# Patient Record
Sex: MALE | Race: BLACK OR AFRICAN AMERICAN
[De-identification: names, ages, dates, MRNs, and addresses within clinical notes are randomized per-mention and may not be internally consistent; named-entity substitution may affect disease eponyms.]

---

## 2020-07-17 ENCOUNTER — HOSPITAL ENCOUNTER (INPATIENT)
Dept: HOSPITAL 26 - MED | Age: 30
LOS: 4 days | Discharge: HOME | DRG: 637 | End: 2020-07-21
Attending: FAMILY MEDICINE | Admitting: FAMILY MEDICINE
Payer: COMMERCIAL

## 2020-07-17 VITALS — BODY MASS INDEX: 39.82 KG/M2 | HEIGHT: 72 IN | WEIGHT: 294 LBS

## 2020-07-17 DIAGNOSIS — Z20.828: ICD-10-CM

## 2020-07-17 DIAGNOSIS — I34.0: ICD-10-CM

## 2020-07-17 DIAGNOSIS — E11.9: ICD-10-CM

## 2020-07-17 DIAGNOSIS — Z79.4: ICD-10-CM

## 2020-07-17 DIAGNOSIS — E87.6: ICD-10-CM

## 2020-07-17 DIAGNOSIS — E78.2: ICD-10-CM

## 2020-07-17 DIAGNOSIS — E66.9: ICD-10-CM

## 2020-07-17 DIAGNOSIS — K30: ICD-10-CM

## 2020-07-17 DIAGNOSIS — E78.5: ICD-10-CM

## 2020-07-17 DIAGNOSIS — E83.42: ICD-10-CM

## 2020-07-17 DIAGNOSIS — N17.0: ICD-10-CM

## 2020-07-17 DIAGNOSIS — E87.1: ICD-10-CM

## 2020-07-17 DIAGNOSIS — E11.10: Primary | ICD-10-CM

## 2020-07-17 DIAGNOSIS — F20.9: ICD-10-CM

## 2020-07-17 DIAGNOSIS — E83.39: ICD-10-CM

## 2020-07-17 LAB
ALBUMIN FLD-MCNC: 4.4 G/DL (ref 3.4–5)
AMYLASE SERPL-CCNC: 23 U/L (ref 25–115)
ANION GAP SERPL CALCULATED.3IONS-SCNC: 22.2 MMOL/L (ref 8–16)
ANION GAP SERPL CALCULATED.3IONS-SCNC: 26.4 MMOL/L (ref 8–16)
ANION GAP SERPL CALCULATED.3IONS-SCNC: 32.6 MMOL/L (ref 8–16)
ANION GAP SERPL CALCULATED.3IONS-SCNC: 35.4 MMOL/L (ref 8–16)
APPEARANCE UR: CLEAR
AST SERPL-CCNC: 28 U/L (ref 15–37)
BASOPHILS # BLD AUTO: 0.1 K/UL (ref 0–0.22)
BASOPHILS NFR BLD AUTO: 0.7 % (ref 0–2)
BILIRUB SERPL-MCNC: 0.6 MG/DL (ref 0–1)
BILIRUB UR QL STRIP: NEGATIVE
BUN SERPL-MCNC: 10 MG/DL (ref 7–18)
BUN SERPL-MCNC: 11 MG/DL (ref 7–18)
BUN SERPL-MCNC: 15 MG/DL (ref 7–18)
BUN SERPL-MCNC: 17 MG/DL (ref 7–18)
CHLORIDE SERPL-SCNC: 109 MMOL/L (ref 98–107)
CHLORIDE SERPL-SCNC: 111 MMOL/L (ref 98–107)
CHLORIDE SERPL-SCNC: 93 MMOL/L (ref 98–107)
CHLORIDE SERPL-SCNC: 99 MMOL/L (ref 98–107)
CHOLEST/HDLC SERPL: 9.5 {RATIO} (ref 1–4.5)
CO2 SERPL-SCNC: 10 MMOL/L (ref 21–32)
CO2 SERPL-SCNC: 10.6 MMOL/L (ref 21–32)
CO2 SERPL-SCNC: 12.1 MMOL/L (ref 21–32)
CO2 SERPL-SCNC: 18.6 MMOL/L (ref 21–32)
COLOR UR: YELLOW
CREAT SERPL-MCNC: 1.4 MG/DL (ref 0.6–1.3)
CREAT SERPL-MCNC: 1.5 MG/DL (ref 0.6–1.3)
CREAT SERPL-MCNC: 1.6 MG/DL (ref 0.6–1.3)
CREAT SERPL-MCNC: 1.8 MG/DL (ref 0.6–1.3)
EOSINOPHIL # BLD AUTO: 0 K/UL (ref 0–0.4)
EOSINOPHIL NFR BLD AUTO: 0 % (ref 0–4)
ERYTHROCYTE [DISTWIDTH] IN BLOOD BY AUTOMATED COUNT: 14.9 % (ref 11.6–13.7)
GFR SERPL CREATININE-BSD FRML MDRD: 58 ML/MIN (ref 90–?)
GFR SERPL CREATININE-BSD FRML MDRD: 66 ML/MIN (ref 90–?)
GFR SERPL CREATININE-BSD FRML MDRD: 71 ML/MIN (ref 90–?)
GFR SERPL CREATININE-BSD FRML MDRD: 77 ML/MIN (ref 90–?)
GLUCOSE SERPL-MCNC: 194 MG/DL (ref 74–106)
GLUCOSE SERPL-MCNC: 270 MG/DL (ref 74–106)
GLUCOSE SERPL-MCNC: 549 MG/DL (ref 74–106)
GLUCOSE SERPL-MCNC: 676 MG/DL (ref 74–106)
GLUCOSE UR STRIP-MCNC: (no result) MG/DL
HCT VFR BLD AUTO: 51.8 % (ref 36–52)
HDLC SERPL-MCNC: 32 MG/DL (ref 40–60)
HGB BLD-MCNC: 17.3 G/DL (ref 12–18)
HGB UR QL STRIP: (no result)
LDLC SERPL CALC-MCNC: 176 MG/DL (ref 60–100)
LEUKOCYTE ESTERASE UR QL STRIP: NEGATIVE
LIPASE SERPL-CCNC: 78 U/L (ref 73–393)
LYMPHOCYTES # BLD AUTO: 1.2 K/UL (ref 2–11.5)
LYMPHOCYTES NFR BLD AUTO: 14 % (ref 20.5–51.1)
MAGNESIUM SERPL-MCNC: 2 MG/DL (ref 1.8–2.4)
MAGNESIUM SERPL-MCNC: 2 MG/DL (ref 1.8–2.4)
MAGNESIUM SERPL-MCNC: 2.1 MG/DL (ref 1.8–2.4)
MAGNESIUM SERPL-MCNC: 2.3 MG/DL (ref 1.8–2.4)
MCH RBC QN AUTO: 29 PG (ref 27–31)
MCHC RBC AUTO-ENTMCNC: 34 G/DL (ref 33–37)
MCV RBC AUTO: 87.2 FL (ref 80–94)
MONOCYTES # BLD AUTO: 0.4 K/UL (ref 0.8–1)
MONOCYTES NFR BLD AUTO: 4.3 % (ref 1.7–9.3)
NEUTROPHILS # BLD AUTO: 7.2 K/UL (ref 1.8–7.7)
NEUTROPHILS NFR BLD AUTO: 81 % (ref 42.2–75.2)
NITRITE UR QL STRIP: NEGATIVE
PH UR STRIP: 5.5 [PH] (ref 5–9)
PHOSPHATE SERPL-MCNC: 1.9 MG/DL (ref 2.5–4.9)
PHOSPHATE SERPL-MCNC: 2 MG/DL (ref 2.5–4.9)
PHOSPHATE SERPL-MCNC: 4.6 MG/DL (ref 2.5–4.9)
PHOSPHATE SERPL-MCNC: 5.7 MG/DL (ref 2.5–4.9)
PLATELET # BLD AUTO: 313 K/UL (ref 140–450)
POTASSIUM SERPL-SCNC: 3.5 MMOL/L (ref 3.5–5.1)
POTASSIUM SERPL-SCNC: 3.8 MMOL/L (ref 3.5–5.1)
POTASSIUM SERPL-SCNC: 4.2 MMOL/L (ref 3.5–5.1)
POTASSIUM SERPL-SCNC: 4.4 MMOL/L (ref 3.5–5.1)
PROTHROMBIN TIME: 11.2 SECS (ref 10.8–13.4)
RBC # BLD AUTO: 5.94 MIL/UL (ref 4.2–6.1)
RBC #/AREA URNS HPF: (no result) /HPF (ref 0–5)
SODIUM SERPL-SCNC: 134 MMOL/L (ref 136–145)
SODIUM SERPL-SCNC: 138 MMOL/L (ref 136–145)
SODIUM SERPL-SCNC: 144 MMOL/L (ref 136–145)
SODIUM SERPL-SCNC: 148 MMOL/L (ref 136–145)
T4 FREE SERPL-MCNC: 1.57 NG/DL (ref 0.76–1.46)
TRIGL SERPL-MCNC: 481 MG/DL (ref 30–150)
TSH SERPL DL<=0.05 MIU/L-ACNC: 1.33 UIU/ML (ref 0.34–3.74)
WBC # BLD AUTO: 8.8 K/UL (ref 4.8–10.8)
WBC,URINE: (no result) /HPF (ref 0–5)

## 2020-07-17 PROCEDURE — C1751 CATH, INF, PER/CENT/MIDLINE: HCPCS

## 2020-07-17 PROCEDURE — 02HV33Z INSERTION OF INFUSION DEVICE INTO SUPERIOR VENA CAVA, PERCUTANEOUS APPROACH: ICD-10-PCS

## 2020-07-17 RX ADMIN — INSULIN HUMAN SCH MLS/HR: 100 INJECTION, SOLUTION PARENTERAL at 13:05

## 2020-07-17 RX ADMIN — Medication SCH DEV: at 19:43

## 2020-07-17 RX ADMIN — Medication SCH DEV: at 02:40

## 2020-07-17 RX ADMIN — DEXTROSE AND SODIUM CHLORIDE SCH MLS/HR: 5; .45 INJECTION, SOLUTION INTRAVENOUS at 19:33

## 2020-07-17 RX ADMIN — SODIUM CHLORIDE SCH MLS/HR: 9 INJECTION, SOLUTION INTRAVENOUS at 02:00

## 2020-07-17 RX ADMIN — SODIUM CHLORIDE SCH MLS/HR: 9 INJECTION, SOLUTION INTRAVENOUS at 12:18

## 2020-07-17 RX ADMIN — Medication SCH DEV: at 05:40

## 2020-07-17 RX ADMIN — SODIUM CHLORIDE SCH MLS/HR: 9 INJECTION, SOLUTION INTRAVENOUS at 16:28

## 2020-07-17 NOTE — NUR
30M PRESENTS TO ED WITH C/O GENERALIZED WEAKNESS, NIGHT SWEATS, FEELINGS OF 
FAINTING, NAUSEA AND VOMITING. PT STATES X 2 WEEKS. UPON ASSESSMENT, PT A/O X 
4, GCS 15. DENIES HEADACHE/BLURRY VISION. RR EVEN AND UNLABORED. CBL SOUNDS. 
DENIES SOB/COUGH. ABDOMEN SOFT AND NONTENDER. NORMOACTIVE BOWEL SOUNDS. DENIES 
DIARRHEA. ERMD MADE AWARE OF PT STATUS. PT PLACED FOR COMFORT WITH HOB 
ELEVATED. SAFETY PRECAUTIONS IN PLACE WITH BED LOWEST AND LOCKED, RAILS X 2. PT 
PLACED IN GOWN. PT PLACED IN CARDIAC MONITORING, WITH SPO2 AND BP MONITORING IN 
PLACE. 



PMHX: HTN, ANXIETY



NKA

## 2020-07-17 NOTE — NUR
PER DR ESCUDERO TO START D5 1/2NS  MLS/HR 

ONCE PARAMETERS ARE MET, PT BEING CLOSELY MONITORED



SEE EMAR

## 2020-07-17 NOTE — NUR
SPOKE TO MD ESCUDERO. NEW ORDERS OVER THE PHONE. START 0.9% NS AT RATE OF 175ML/HR. 
ALSO IF BLOOD GLUCOSE IS < 150, CONTINUE WITH D5/0.45% NS AT RATE OF 250ML/HR. 
IF BLOOD GLUCOSE IS > 200, STOP THE D5/0.45% NS AND CONTINUE WITH 0.9% NS RATE 
175ML/HR. ADDITIONAL ORDER: BLOOD GLUCOSE CHECK Q2H, DC GLUCOSE CHECK Q1H. home

## 2020-07-18 VITALS — DIASTOLIC BLOOD PRESSURE: 115 MMHG | SYSTOLIC BLOOD PRESSURE: 153 MMHG

## 2020-07-18 VITALS — DIASTOLIC BLOOD PRESSURE: 116 MMHG | SYSTOLIC BLOOD PRESSURE: 167 MMHG

## 2020-07-18 VITALS — SYSTOLIC BLOOD PRESSURE: 140 MMHG | DIASTOLIC BLOOD PRESSURE: 96 MMHG

## 2020-07-18 VITALS — SYSTOLIC BLOOD PRESSURE: 161 MMHG | DIASTOLIC BLOOD PRESSURE: 104 MMHG

## 2020-07-18 VITALS — SYSTOLIC BLOOD PRESSURE: 165 MMHG | DIASTOLIC BLOOD PRESSURE: 106 MMHG

## 2020-07-18 VITALS — SYSTOLIC BLOOD PRESSURE: 158 MMHG | DIASTOLIC BLOOD PRESSURE: 121 MMHG

## 2020-07-18 VITALS — SYSTOLIC BLOOD PRESSURE: 160 MMHG | DIASTOLIC BLOOD PRESSURE: 102 MMHG

## 2020-07-18 VITALS — SYSTOLIC BLOOD PRESSURE: 161 MMHG | DIASTOLIC BLOOD PRESSURE: 95 MMHG

## 2020-07-18 VITALS — SYSTOLIC BLOOD PRESSURE: 153 MMHG | DIASTOLIC BLOOD PRESSURE: 115 MMHG

## 2020-07-18 VITALS — DIASTOLIC BLOOD PRESSURE: 110 MMHG | SYSTOLIC BLOOD PRESSURE: 166 MMHG

## 2020-07-18 VITALS — SYSTOLIC BLOOD PRESSURE: 167 MMHG | DIASTOLIC BLOOD PRESSURE: 108 MMHG

## 2020-07-18 LAB
ANION GAP SERPL CALCULATED.3IONS-SCNC: 16.9 MMOL/L (ref 8–16)
ANION GAP SERPL CALCULATED.3IONS-SCNC: 19.3 MMOL/L (ref 8–16)
ANION GAP SERPL CALCULATED.3IONS-SCNC: 20 MMOL/L (ref 8–16)
ANION GAP SERPL CALCULATED.3IONS-SCNC: 20.8 MMOL/L (ref 8–16)
ANION GAP SERPL CALCULATED.3IONS-SCNC: 23 MMOL/L (ref 8–16)
BUN SERPL-MCNC: 2 MG/DL (ref 7–18)
BUN SERPL-MCNC: 3 MG/DL (ref 7–18)
BUN SERPL-MCNC: 6 MG/DL (ref 7–18)
BUN SERPL-MCNC: 7 MG/DL (ref 7–18)
BUN SERPL-MCNC: 8 MG/DL (ref 7–18)
CHLORIDE SERPL-SCNC: 104 MMOL/L (ref 98–107)
CHLORIDE SERPL-SCNC: 106 MMOL/L (ref 98–107)
CHLORIDE SERPL-SCNC: 110 MMOL/L (ref 98–107)
CHLORIDE SERPL-SCNC: 110 MMOL/L (ref 98–107)
CHLORIDE SERPL-SCNC: 111 MMOL/L (ref 98–107)
CO2 SERPL-SCNC: 15.5 MMOL/L (ref 21–32)
CO2 SERPL-SCNC: 17.7 MMOL/L (ref 21–32)
CO2 SERPL-SCNC: 19.1 MMOL/L (ref 21–32)
CO2 SERPL-SCNC: 20.8 MMOL/L (ref 21–32)
CO2 SERPL-SCNC: 22.3 MMOL/L (ref 21–32)
CREAT SERPL-MCNC: 1 MG/DL (ref 0.6–1.3)
CREAT SERPL-MCNC: 1 MG/DL (ref 0.6–1.3)
CREAT SERPL-MCNC: 1.2 MG/DL (ref 0.6–1.3)
CREAT SERPL-MCNC: 1.2 MG/DL (ref 0.6–1.3)
CREAT SERPL-MCNC: 1.5 MG/DL (ref 0.6–1.3)
GFR SERPL CREATININE-BSD FRML MDRD: 114 ML/MIN (ref 90–?)
GFR SERPL CREATININE-BSD FRML MDRD: 114 ML/MIN (ref 90–?)
GFR SERPL CREATININE-BSD FRML MDRD: 71 ML/MIN (ref 90–?)
GFR SERPL CREATININE-BSD FRML MDRD: 92 ML/MIN (ref 90–?)
GFR SERPL CREATININE-BSD FRML MDRD: 92 ML/MIN (ref 90–?)
GLUCOSE SERPL-MCNC: 143 MG/DL (ref 74–106)
GLUCOSE SERPL-MCNC: 177 MG/DL (ref 74–106)
GLUCOSE SERPL-MCNC: 193 MG/DL (ref 74–106)
GLUCOSE SERPL-MCNC: 200 MG/DL (ref 74–106)
GLUCOSE SERPL-MCNC: 254 MG/DL (ref 74–106)
MAGNESIUM SERPL-MCNC: 1.6 MG/DL (ref 1.8–2.4)
MAGNESIUM SERPL-MCNC: 1.7 MG/DL (ref 1.8–2.4)
MAGNESIUM SERPL-MCNC: 1.7 MG/DL (ref 1.8–2.4)
MAGNESIUM SERPL-MCNC: 1.8 MG/DL (ref 1.8–2.4)
MAGNESIUM SERPL-MCNC: 1.8 MG/DL (ref 1.8–2.4)
PHOSPHATE SERPL-MCNC: 1.2 MG/DL (ref 2.5–4.9)
PHOSPHATE SERPL-MCNC: 1.4 MG/DL (ref 2.5–4.9)
PHOSPHATE SERPL-MCNC: 1.6 MG/DL (ref 2.5–4.9)
POTASSIUM SERPL-SCNC: 3.1 MMOL/L (ref 3.5–5.1)
POTASSIUM SERPL-SCNC: 3.1 MMOL/L (ref 3.5–5.1)
POTASSIUM SERPL-SCNC: 3.2 MMOL/L (ref 3.5–5.1)
POTASSIUM SERPL-SCNC: 3.5 MMOL/L (ref 3.5–5.1)
POTASSIUM SERPL-SCNC: 3.5 MMOL/L (ref 3.5–5.1)
SODIUM SERPL-SCNC: 140 MMOL/L (ref 136–145)
SODIUM SERPL-SCNC: 143 MMOL/L (ref 136–145)
SODIUM SERPL-SCNC: 145 MMOL/L (ref 136–145)
SODIUM SERPL-SCNC: 146 MMOL/L (ref 136–145)
SODIUM SERPL-SCNC: 146 MMOL/L (ref 136–145)
T3RU NFR SERPL: 30 % (ref 24–39)
T4 SERPL-MCNC: 8.9 UG/DL (ref 4.5–12)

## 2020-07-18 RX ADMIN — Medication SCH DEV: at 03:45

## 2020-07-18 RX ADMIN — DEXTROSE AND SODIUM CHLORIDE SCH MLS/HR: 5; .45 INJECTION, SOLUTION INTRAVENOUS at 04:43

## 2020-07-18 RX ADMIN — Medication SCH DEV: at 16:25

## 2020-07-18 RX ADMIN — Medication SCH DEV: at 13:08

## 2020-07-18 RX ADMIN — Medication SCH MG: at 21:50

## 2020-07-18 RX ADMIN — SODIUM CHLORIDE SCH MLS/HR: 9 INJECTION, SOLUTION INTRAVENOUS at 11:40

## 2020-07-18 RX ADMIN — DEXTROSE AND SODIUM CHLORIDE SCH MLS/HR: 5; .45 INJECTION, SOLUTION INTRAVENOUS at 11:53

## 2020-07-18 RX ADMIN — POTASSIUM & SODIUM PHOSPHATES POWDER PACK 280-160-250 MG SCH PKT: 280-160-250 PACK at 20:25

## 2020-07-18 RX ADMIN — SODIUM CHLORIDE SCH MLS/HR: 9 INJECTION, SOLUTION INTRAVENOUS at 16:30

## 2020-07-18 RX ADMIN — SODIUM CHLORIDE SCH MLS/HR: 9 INJECTION, SOLUTION INTRAVENOUS at 06:45

## 2020-07-18 RX ADMIN — SODIUM CHLORIDE SCH MLS/HR: 9 INJECTION, SOLUTION INTRAVENOUS at 04:33

## 2020-07-18 RX ADMIN — SODIUM CHLORIDE SCH MLS/HR: 9 INJECTION, SOLUTION INTRAVENOUS at 15:30

## 2020-07-18 RX ADMIN — Medication SCH DEV: at 18:09

## 2020-07-18 RX ADMIN — Medication SCH DEV: at 08:30

## 2020-07-18 RX ADMIN — Medication SCH DEV: at 10:22

## 2020-07-18 RX ADMIN — INSULIN HUMAN SCH MLS/HR: 100 INJECTION, SOLUTION PARENTERAL at 11:09

## 2020-07-18 RX ADMIN — Medication SCH DEV: at 21:52

## 2020-07-18 RX ADMIN — Medication SCH DEV: at 11:52

## 2020-07-18 RX ADMIN — Medication SCH DEV: at 04:42

## 2020-07-18 RX ADMIN — DEXTROSE AND SODIUM CHLORIDE SCH MLS/HR: 5; .45 INJECTION, SOLUTION INTRAVENOUS at 16:31

## 2020-07-18 RX ADMIN — DEXTROSE AND SODIUM CHLORIDE SCH MLS/HR: 5; .45 INJECTION, SOLUTION INTRAVENOUS at 18:10

## 2020-07-18 RX ADMIN — Medication SCH DEV: at 02:40

## 2020-07-18 NOTE — NUR
RECEIVED PT FROM KENYATTA RAMIREZ AT 0155 FROM ER DEPT FOR DKA, PT IS ON RM AIR WITH O2 SATURATIONS 
98%, LUNG SOUNDS CLEAR, S1 AND S2 HEART SOUNDS HEARD, PULSES PALPABLE UPPER AND LOWER 
EXTREMITIES, BOWEL SOUNDS ACTIVE, PT FOLLOWS COMMANDS IS ALERT AND ORIENTED,  DENIES ANY 
PAIN OR DISCOMFORT, SKIN IS DRY AND INTACT, IS CURRENTLY RESTING IN BED WATCHING TV, NO 
SIGNS OF DISTRESS WILL CONTINUE TO MONITOR PT

## 2020-07-18 NOTE — NUR
PATIENT HAS BEEN SCREENED AND CATEGORIZED AS HIGH NUTRITION RISK. PATIENT WILL BE SEEN 
WITHIN 1-2 DAYS OF ADMISSION.



07/19/20-07/20/20



TOBIAS SIMS MS, RDN

## 2020-07-18 NOTE — NUR
Patient will be admitted to care of DR. ESCUDERO. Admited to ICU.  Will go to room 
5. Belongings list completed.  Report to KENYATTA JENKINS.

## 2020-07-18 NOTE — NUR
RECEIVED PATIENT ON BED; AWAKE, ALERT AND ORIENTED ABLE TO MOVE LIMBS FREELY. BREATHING EVEN 
AND UNLABORE ON ROOM AIR; SO2 99%. CARDIACSCOPE SHOWS ON SINUS RHYTHM HR99/MIN. COMMENCING 
ON IV INSULIN DRIP AT 12.5 UNITS/HR VIA G I8 IV CANNULA ON RIGHT AC AND ON D5 1/5 250 ML/HR 
AS PER DKA PROTOCOL. ABDOMEN IS SOFT AND OBESE; ACTIVE BOWEL SOUNDS; KEPT NPO EXCEPT MEDS AS 
ORDERED.

## 2020-07-18 NOTE — NUR
CALLED DR. ZOHREH ESCUDERO  FOREMENTIONED MD NOT IN AREA REVIEW ABG SAMPLE REPORT RCP TO 
ATTEMPT AT A LATER TIME

## 2020-07-18 NOTE — NUR
CALLED DR. ZHOREH SORENSEN MD IN ED  REVIEWED ABG SAMPLE REPORT "IMPROVING" CONTINUE WITH 
ABG ORDERS

## 2020-07-18 NOTE — NUR
REFERRED TO DR. ESCUDERO  BY TELEPHONE BECAUSE K LEVEL  IS ONLY 3.1 AND MG LEVEL IS 1.6; GOT NEW 
ORDER; CARRIED OUT.

## 2020-07-18 NOTE — NUR
PT RESTING IN BED, BLOOD SUGAR RECHECKED AND REMAINS BELOW 200, NO SIGNS OF DISTRESS NOTED 
AT THIS TIME WILL CONTINUE TO MONITOR PT

## 2020-07-19 VITALS — DIASTOLIC BLOOD PRESSURE: 84 MMHG | SYSTOLIC BLOOD PRESSURE: 128 MMHG

## 2020-07-19 VITALS — DIASTOLIC BLOOD PRESSURE: 107 MMHG | SYSTOLIC BLOOD PRESSURE: 161 MMHG

## 2020-07-19 VITALS — SYSTOLIC BLOOD PRESSURE: 148 MMHG | DIASTOLIC BLOOD PRESSURE: 105 MMHG

## 2020-07-19 VITALS — DIASTOLIC BLOOD PRESSURE: 71 MMHG | SYSTOLIC BLOOD PRESSURE: 135 MMHG

## 2020-07-19 VITALS — SYSTOLIC BLOOD PRESSURE: 120 MMHG | DIASTOLIC BLOOD PRESSURE: 77 MMHG

## 2020-07-19 VITALS — SYSTOLIC BLOOD PRESSURE: 151 MMHG | DIASTOLIC BLOOD PRESSURE: 101 MMHG

## 2020-07-19 VITALS — SYSTOLIC BLOOD PRESSURE: 128 MMHG | DIASTOLIC BLOOD PRESSURE: 84 MMHG

## 2020-07-19 VITALS — DIASTOLIC BLOOD PRESSURE: 110 MMHG | SYSTOLIC BLOOD PRESSURE: 154 MMHG

## 2020-07-19 VITALS — SYSTOLIC BLOOD PRESSURE: 122 MMHG | DIASTOLIC BLOOD PRESSURE: 54 MMHG

## 2020-07-19 LAB
ANION GAP SERPL CALCULATED.3IONS-SCNC: 13.6 MMOL/L (ref 8–16)
ANION GAP SERPL CALCULATED.3IONS-SCNC: 15.9 MMOL/L (ref 8–16)
ANION GAP SERPL CALCULATED.3IONS-SCNC: 17 MMOL/L (ref 8–16)
ANION GAP SERPL CALCULATED.3IONS-SCNC: 17.3 MMOL/L (ref 8–16)
ANION GAP SERPL CALCULATED.3IONS-SCNC: 19 MMOL/L (ref 8–16)
BASOPHILS # BLD AUTO: 0 K/UL (ref 0–0.22)
BASOPHILS NFR BLD AUTO: 0.4 % (ref 0–2)
BUN SERPL-MCNC: 2 MG/DL (ref 7–18)
BUN SERPL-MCNC: 3 MG/DL (ref 7–18)
BUN SERPL-MCNC: 4 MG/DL (ref 7–18)
CHLORIDE SERPL-SCNC: 102 MMOL/L (ref 98–107)
CHLORIDE SERPL-SCNC: 103 MMOL/L (ref 98–107)
CHLORIDE SERPL-SCNC: 104 MMOL/L (ref 98–107)
CHLORIDE SERPL-SCNC: 104 MMOL/L (ref 98–107)
CHLORIDE SERPL-SCNC: 105 MMOL/L (ref 98–107)
CO2 SERPL-SCNC: 20.6 MMOL/L (ref 21–32)
CO2 SERPL-SCNC: 22.7 MMOL/L (ref 21–32)
CO2 SERPL-SCNC: 23 MMOL/L (ref 21–32)
CO2 SERPL-SCNC: 23.9 MMOL/L (ref 21–32)
CO2 SERPL-SCNC: 25 MMOL/L (ref 21–32)
CREAT SERPL-MCNC: 0.9 MG/DL (ref 0.6–1.3)
CREAT SERPL-MCNC: 1.1 MG/DL (ref 0.6–1.3)
EOSINOPHIL # BLD AUTO: 0.2 K/UL (ref 0–0.4)
EOSINOPHIL NFR BLD AUTO: 2.4 % (ref 0–4)
ERYTHROCYTE [DISTWIDTH] IN BLOOD BY AUTOMATED COUNT: 14.7 % (ref 11.6–13.7)
GFR SERPL CREATININE-BSD FRML MDRD: 102 ML/MIN (ref 90–?)
GFR SERPL CREATININE-BSD FRML MDRD: 128 ML/MIN (ref 90–?)
GLUCOSE SERPL-MCNC: 134 MG/DL (ref 74–106)
GLUCOSE SERPL-MCNC: 138 MG/DL (ref 74–106)
GLUCOSE SERPL-MCNC: 164 MG/DL (ref 74–106)
GLUCOSE SERPL-MCNC: 190 MG/DL (ref 74–106)
GLUCOSE SERPL-MCNC: 191 MG/DL (ref 74–106)
HCT VFR BLD AUTO: 41.4 % (ref 36–52)
HGB BLD-MCNC: 14.1 G/DL (ref 12–18)
LYMPHOCYTES # BLD AUTO: 2.3 K/UL (ref 2–11.5)
LYMPHOCYTES NFR BLD AUTO: 36.8 % (ref 20.5–51.1)
MAGNESIUM SERPL-MCNC: 1.6 MG/DL (ref 1.8–2.4)
MAGNESIUM SERPL-MCNC: 1.6 MG/DL (ref 1.8–2.4)
MAGNESIUM SERPL-MCNC: 1.7 MG/DL (ref 1.8–2.4)
MAGNESIUM SERPL-MCNC: 1.7 MG/DL (ref 1.8–2.4)
MAGNESIUM SERPL-MCNC: 1.9 MG/DL (ref 1.8–2.4)
MCH RBC QN AUTO: 29 PG (ref 27–31)
MCHC RBC AUTO-ENTMCNC: 34 G/DL (ref 33–37)
MCV RBC AUTO: 84.9 FL (ref 80–94)
MONOCYTES # BLD AUTO: 0.4 K/UL (ref 0.8–1)
MONOCYTES NFR BLD AUTO: 6.9 % (ref 1.7–9.3)
NEUTROPHILS # BLD AUTO: 3.4 K/UL (ref 1.8–7.7)
NEUTROPHILS NFR BLD AUTO: 53.5 % (ref 42.2–75.2)
PHOSPHATE SERPL-MCNC: 1.5 MG/DL (ref 2.5–4.9)
PHOSPHATE SERPL-MCNC: 1.6 MG/DL (ref 2.5–4.9)
PHOSPHATE SERPL-MCNC: 2 MG/DL (ref 2.5–4.9)
PHOSPHATE SERPL-MCNC: 2.4 MG/DL (ref 2.5–4.9)
PHOSPHATE SERPL-MCNC: 2.7 MG/DL (ref 2.5–4.9)
PLATELET # BLD AUTO: 243 K/UL (ref 140–450)
POTASSIUM SERPL-SCNC: 2.6 MMOL/L (ref 3.5–5.1)
POTASSIUM SERPL-SCNC: 3 MMOL/L (ref 3.5–5.1)
POTASSIUM SERPL-SCNC: 3.2 MMOL/L (ref 3.5–5.1)
RBC # BLD AUTO: 4.87 MIL/UL (ref 4.2–6.1)
SODIUM SERPL-SCNC: 139 MMOL/L (ref 136–145)
SODIUM SERPL-SCNC: 140 MMOL/L (ref 136–145)
SODIUM SERPL-SCNC: 140 MMOL/L (ref 136–145)
SODIUM SERPL-SCNC: 141 MMOL/L (ref 136–145)
SODIUM SERPL-SCNC: 142 MMOL/L (ref 136–145)
WBC # BLD AUTO: 6.4 K/UL (ref 4.8–10.8)

## 2020-07-19 RX ADMIN — POTASSIUM CHLORIDE SCH MLS/HR: 200 INJECTION, SOLUTION INTRAVENOUS at 07:35

## 2020-07-19 RX ADMIN — INSULIN HUMAN SCH MLS/HR: 100 INJECTION, SOLUTION PARENTERAL at 03:03

## 2020-07-19 RX ADMIN — POTASSIUM CHLORIDE SCH MLS/HR: 200 INJECTION, SOLUTION INTRAVENOUS at 09:35

## 2020-07-19 RX ADMIN — Medication SCH DEV: at 19:13

## 2020-07-19 RX ADMIN — Medication SCH DEV: at 21:30

## 2020-07-19 RX ADMIN — INSULIN HUMAN SCH MLS/HR: 100 INJECTION, SOLUTION PARENTERAL at 14:14

## 2020-07-19 RX ADMIN — DEXTROSE AND SODIUM CHLORIDE SCH MLS/HR: 5; .45 INJECTION, SOLUTION INTRAVENOUS at 20:15

## 2020-07-19 RX ADMIN — POTASSIUM & SODIUM PHOSPHATES POWDER PACK 280-160-250 MG SCH PKT: 280-160-250 PACK at 17:00

## 2020-07-19 RX ADMIN — DEXTROSE AND SODIUM CHLORIDE SCH MLS/HR: 5; .45 INJECTION, SOLUTION INTRAVENOUS at 04:09

## 2020-07-19 RX ADMIN — Medication SCH DEV: at 12:30

## 2020-07-19 RX ADMIN — POTASSIUM & SODIUM PHOSPHATES POWDER PACK 280-160-250 MG SCH PKT: 280-160-250 PACK at 09:23

## 2020-07-19 RX ADMIN — SODIUM CHLORIDE SCH MLS/HR: 9 INJECTION, SOLUTION INTRAVENOUS at 04:00

## 2020-07-19 RX ADMIN — Medication SCH DEV: at 09:30

## 2020-07-19 RX ADMIN — POTASSIUM CHLORIDE SCH MEQ: 40 SOLUTION ORAL at 12:00

## 2020-07-19 RX ADMIN — SODIUM CHLORIDE SCH MLS/HR: 9 INJECTION, SOLUTION INTRAVENOUS at 23:40

## 2020-07-19 RX ADMIN — Medication SCH MG: at 21:00

## 2020-07-19 RX ADMIN — POTASSIUM CHLORIDE SCH MEQ: 40 SOLUTION ORAL at 10:00

## 2020-07-19 RX ADMIN — POTASSIUM & SODIUM PHOSPHATES POWDER PACK 280-160-250 MG SCH PKT: 280-160-250 PACK at 17:22

## 2020-07-19 RX ADMIN — MAGNESIUM OXIDE TAB 400 MG (241.3 MG ELEMENTAL MG) SCH MG: 400 (241.3 MG) TAB at 09:20

## 2020-07-19 RX ADMIN — SODIUM CHLORIDE SCH MLS/HR: 9 INJECTION, SOLUTION INTRAVENOUS at 19:40

## 2020-07-19 RX ADMIN — Medication SCH DEV: at 03:30

## 2020-07-19 RX ADMIN — SODIUM CHLORIDE SCH MLS/HR: 9 INJECTION, SOLUTION INTRAVENOUS at 19:32

## 2020-07-19 RX ADMIN — Medication SCH DEV: at 06:30

## 2020-07-19 RX ADMIN — SODIUM CHLORIDE SCH MLS/HR: 9 INJECTION, SOLUTION INTRAVENOUS at 11:40

## 2020-07-19 RX ADMIN — POTASSIUM & SODIUM PHOSPHATES POWDER PACK 280-160-250 MG SCH PKT: 280-160-250 PACK at 13:00

## 2020-07-19 RX ADMIN — Medication SCH DEV: at 00:30

## 2020-07-19 RX ADMIN — POTASSIUM & SODIUM PHOSPHATES POWDER PACK 280-160-250 MG SCH PKT: 280-160-250 PACK at 09:20

## 2020-07-19 RX ADMIN — Medication SCH DEV: at 15:30

## 2020-07-19 RX ADMIN — Medication SCH MG: at 09:21

## 2020-07-19 RX ADMIN — SODIUM CHLORIDE SCH MLS/HR: 9 INJECTION, SOLUTION INTRAVENOUS at 15:40

## 2020-07-19 NOTE — NUR
RECEIVED PATIENT ON BED WATCHING TELEVISION; AWAKE, ALERT AND ORIENTED; ABLE TO MOVE LIMBS 
FREELY. BREATHING EVEN AND UNLABORED, ON ROOM AIR. SO2 100%. IVF IN PROGRESS VIA PICC LINE 
TO RIGHT UPPER ARM AS FOLLOWS NORMAL SALINE  ML/HR AND ON INSULIN DRIP AT 9.3 
UNITS/HR; PATENT AND INTACT. ABDOMEN IS SOFT, OBESE, NON TENDER; BOWEL SOUNDS ACTIVE.

## 2020-07-19 NOTE — NUR
RECEIVED REPORT FROM JENNIFER YOU . PT IS AWAKE ALERT ORIENTEDX4  IV FLUID IS ON THE LEFT WRIT . 
INSULIN INFUS AT 9.3 UNIT /HR SKIN DRY AND WARM DENIED PAIN.

## 2020-07-19 NOTE — NUR
PATIENT WENT INTO SINUS TACHYCARDIA WITH -170/MIN. PATIENT IS JUST RESTING IN THE BED  
AND HE SAID HE'S OKAY AND DENIES ANY CHEST PAIN; REFERRED TO DR. ESCUDERO BY TELEPHONE AND HE 
ORDERED TO GIVE PATIENT CARDIZEM 30 MG PO NOW AND TO DO A 12 LEAD EKG; CARRIED OUT.

## 2020-07-19 NOTE — NUR
BLOOD SUGAR TAKEN AND RECORDED Q3 HRS AS ORDERED; INSULIN DRIP AND IV FLUID ADJUSTED AS PER 
PROTOCOL

## 2020-07-20 VITALS — DIASTOLIC BLOOD PRESSURE: 71 MMHG | SYSTOLIC BLOOD PRESSURE: 118 MMHG

## 2020-07-20 VITALS — DIASTOLIC BLOOD PRESSURE: 59 MMHG | SYSTOLIC BLOOD PRESSURE: 112 MMHG

## 2020-07-20 VITALS — SYSTOLIC BLOOD PRESSURE: 126 MMHG | DIASTOLIC BLOOD PRESSURE: 70 MMHG

## 2020-07-20 VITALS — SYSTOLIC BLOOD PRESSURE: 124 MMHG | DIASTOLIC BLOOD PRESSURE: 77 MMHG

## 2020-07-20 VITALS — SYSTOLIC BLOOD PRESSURE: 131 MMHG | DIASTOLIC BLOOD PRESSURE: 63 MMHG

## 2020-07-20 VITALS — SYSTOLIC BLOOD PRESSURE: 118 MMHG | DIASTOLIC BLOOD PRESSURE: 66 MMHG

## 2020-07-20 VITALS — SYSTOLIC BLOOD PRESSURE: 139 MMHG | DIASTOLIC BLOOD PRESSURE: 65 MMHG

## 2020-07-20 VITALS — DIASTOLIC BLOOD PRESSURE: 63 MMHG | SYSTOLIC BLOOD PRESSURE: 121 MMHG

## 2020-07-20 VITALS — SYSTOLIC BLOOD PRESSURE: 123 MMHG | DIASTOLIC BLOOD PRESSURE: 72 MMHG

## 2020-07-20 VITALS — SYSTOLIC BLOOD PRESSURE: 129 MMHG | DIASTOLIC BLOOD PRESSURE: 92 MMHG

## 2020-07-20 VITALS — SYSTOLIC BLOOD PRESSURE: 132 MMHG | DIASTOLIC BLOOD PRESSURE: 71 MMHG

## 2020-07-20 VITALS — SYSTOLIC BLOOD PRESSURE: 128 MMHG | DIASTOLIC BLOOD PRESSURE: 88 MMHG

## 2020-07-20 VITALS — DIASTOLIC BLOOD PRESSURE: 62 MMHG | SYSTOLIC BLOOD PRESSURE: 132 MMHG

## 2020-07-20 VITALS — DIASTOLIC BLOOD PRESSURE: 68 MMHG | SYSTOLIC BLOOD PRESSURE: 131 MMHG

## 2020-07-20 LAB
ALBUMIN FLD-MCNC: 2.8 G/DL (ref 3.4–5)
ANION GAP SERPL CALCULATED.3IONS-SCNC: 12.2 MMOL/L (ref 8–16)
ANION GAP SERPL CALCULATED.3IONS-SCNC: 15.1 MMOL/L (ref 8–16)
AST SERPL-CCNC: 59 U/L (ref 15–37)
BASOPHILS # BLD AUTO: 0.1 K/UL (ref 0–0.22)
BASOPHILS NFR BLD AUTO: 1.2 % (ref 0–2)
BILIRUB SERPL-MCNC: 0.5 MG/DL (ref 0–1)
BUN SERPL-MCNC: 2 MG/DL (ref 7–18)
BUN SERPL-MCNC: 5 MG/DL (ref 7–18)
CHLORIDE SERPL-SCNC: 107 MMOL/L (ref 98–107)
CHLORIDE SERPL-SCNC: 107 MMOL/L (ref 98–107)
CO2 SERPL-SCNC: 22.3 MMOL/L (ref 21–32)
CO2 SERPL-SCNC: 24.9 MMOL/L (ref 21–32)
CREAT SERPL-MCNC: 0.7 MG/DL (ref 0.6–1.3)
CREAT SERPL-MCNC: 0.8 MG/DL (ref 0.6–1.3)
EOSINOPHIL # BLD AUTO: 0.1 K/UL (ref 0–0.4)
EOSINOPHIL NFR BLD AUTO: 2.4 % (ref 0–4)
ERYTHROCYTE [DISTWIDTH] IN BLOOD BY AUTOMATED COUNT: 15.1 % (ref 11.6–13.7)
GFR SERPL CREATININE-BSD FRML MDRD: 147 ML/MIN (ref 90–?)
GFR SERPL CREATININE-BSD FRML MDRD: 171 ML/MIN (ref 90–?)
GLUCOSE SERPL-MCNC: 143 MG/DL (ref 74–106)
GLUCOSE SERPL-MCNC: 225 MG/DL (ref 74–106)
HCT VFR BLD AUTO: 40.5 % (ref 36–52)
HGB BLD-MCNC: 13.8 G/DL (ref 12–18)
LYMPHOCYTES # BLD AUTO: 2.3 K/UL (ref 2–11.5)
LYMPHOCYTES NFR BLD AUTO: 49.4 % (ref 20.5–51.1)
MCH RBC QN AUTO: 29 PG (ref 27–31)
MCHC RBC AUTO-ENTMCNC: 34 G/DL (ref 33–37)
MCV RBC AUTO: 84.7 FL (ref 80–94)
MONOCYTES # BLD AUTO: 0.4 K/UL (ref 0.8–1)
MONOCYTES NFR BLD AUTO: 8 % (ref 1.7–9.3)
NEUTROPHILS # BLD AUTO: 1.8 K/UL (ref 1.8–7.7)
NEUTROPHILS NFR BLD AUTO: 39 % (ref 42.2–75.2)
PLATELET # BLD AUTO: 220 K/UL (ref 140–450)
POTASSIUM SERPL-SCNC: 3.1 MMOL/L (ref 3.5–5.1)
POTASSIUM SERPL-SCNC: 4.4 MMOL/L (ref 3.5–5.1)
RBC # BLD AUTO: 4.79 MIL/UL (ref 4.2–6.1)
SODIUM SERPL-SCNC: 140 MMOL/L (ref 136–145)
SODIUM SERPL-SCNC: 141 MMOL/L (ref 136–145)
WBC # BLD AUTO: 4.7 K/UL (ref 4.8–10.8)

## 2020-07-20 RX ADMIN — Medication SCH MG: at 10:07

## 2020-07-20 RX ADMIN — SODIUM CHLORIDE SCH MLS/HR: 9 INJECTION, SOLUTION INTRAVENOUS at 07:45

## 2020-07-20 RX ADMIN — Medication SCH DEV: at 00:56

## 2020-07-20 RX ADMIN — Medication SCH MG: at 20:05

## 2020-07-20 RX ADMIN — POTASSIUM & SODIUM PHOSPHATES POWDER PACK 280-160-250 MG SCH PKT: 280-160-250 PACK at 10:08

## 2020-07-20 RX ADMIN — INSULIN HUMAN SCH MLS/HR: 100 INJECTION, SOLUTION PARENTERAL at 00:52

## 2020-07-20 RX ADMIN — POTASSIUM & SODIUM PHOSPHATES POWDER PACK 280-160-250 MG SCH PKT: 280-160-250 PACK at 13:25

## 2020-07-20 RX ADMIN — INSULIN GLARGINE SCH UNITS: 100 INJECTION, SOLUTION SUBCUTANEOUS at 22:50

## 2020-07-20 RX ADMIN — SODIUM CHLORIDE SCH MLS/HR: 9 INJECTION, SOLUTION INTRAVENOUS at 12:58

## 2020-07-20 RX ADMIN — SODIUM CHLORIDE SCH MLS/HR: 9 INJECTION, SOLUTION INTRAVENOUS at 03:40

## 2020-07-20 RX ADMIN — Medication SCH DEV: at 16:52

## 2020-07-20 RX ADMIN — Medication SCH DEV: at 03:30

## 2020-07-20 RX ADMIN — SODIUM CHLORIDE SCH MLS/HR: 9 INJECTION, SOLUTION INTRAVENOUS at 22:50

## 2020-07-20 RX ADMIN — Medication SCH DEV: at 20:05

## 2020-07-20 RX ADMIN — Medication SCH DEV: at 12:00

## 2020-07-20 RX ADMIN — INSULIN LISPRO PRN UNITS: 100 INJECTION, SOLUTION INTRAVENOUS; SUBCUTANEOUS at 16:54

## 2020-07-20 RX ADMIN — INSULIN LISPRO PRN UNITS: 100 INJECTION, SOLUTION INTRAVENOUS; SUBCUTANEOUS at 20:09

## 2020-07-20 RX ADMIN — MAGNESIUM OXIDE TAB 400 MG (241.3 MG ELEMENTAL MG) SCH MG: 400 (241.3 MG) TAB at 10:06

## 2020-07-20 RX ADMIN — SODIUM CHLORIDE SCH MLS/HR: 9 INJECTION, SOLUTION INTRAVENOUS at 17:58

## 2020-07-20 RX ADMIN — INSULIN LISPRO PRN UNITS: 100 INJECTION, SOLUTION INTRAVENOUS; SUBCUTANEOUS at 11:58

## 2020-07-20 RX ADMIN — Medication SCH DEV: at 07:24

## 2020-07-20 RX ADMIN — POTASSIUM & SODIUM PHOSPHATES POWDER PACK 280-160-250 MG SCH PKT: 280-160-250 PACK at 16:55

## 2020-07-20 NOTE — NUR
PAGED DR. ANA LAURA TIRADO FOR URGENT CARDIO CONSULTATION AS ORDERED BY DR ESCUDERO; LEFT MESSAGE ON 
HIS CELLPHONE.

## 2020-07-20 NOTE — NUR
PT MEDS GIVEN AND PT EDUCATED ON MEDICATION USES, AND PROPER ADMINISTRATION OF INSULIN, PT 
VERBALIZES UNDERSTANDING WILL REASSESS LATER.

## 2020-07-20 NOTE — NUR
7/20/2020

RD INITIAL ASSESSMENT COMPLETED



PLEASE REFER TO NUTRITION ASSESSMENT UNDER CARE ACTIVITY FOR ESTIMATED NUTRITIONAL NEEDS. 



CONTINUE 60 [facility] DIET 



RD TO MONITOR PO INTAKE, DIET TOLERANCE AND ASSESS APPROPRIATENESS OF PATIENT EDUCATION 



RD TO FOLLOW-UP IN 3-5 DAYS PATIENT IS MODERATE RISK.



ADRIEL ENNIS, RD

## 2020-07-20 NOTE — NUR
PATIENT IS AWAKE, LOOKS COMFORTABLE BUT NOTED ON CARDIAC MONITOR HE'S HAVING SUSTAINED 
V-TACH; DENIES ANY CHEST PAIN NOR DIZZINESS; PAGED DR. ESCUDERO IMMEDIATELY WITH NEW ORDERS. ER 
MD DR. KLEIN WAS CALLED IMMEDIATELY; IN AND WITH ORDERS TO GIVE VERSED 2 MG IV STAT' 
CARDIOVERTED THE PATIENT AT 30 JOULES; STILL IN V TACH. ADENOSINE 6 MG IVP GIVEN AND ALSO 
CARDIZEM 20 MG IV GIVEN; PATIENT WENT INTO A FIB WITH RVR FOR FEW SEC THEN REVERTED INTO 
SINUS RHYTHM LATER.

## 2020-07-20 NOTE — NUR
RECEIVED REPORT FROM HAN RN, PT RESTING IN BED, PT ALERT AND ORIENTED FOLLOWS COMMANDS, 
EYES PERRLA 3MM, LUNG SOUNDS CLEAR, S1 AND S2 HEART SOUNDS HEARD, PULSES PALPABLE UPPER AND 
LOWER EXTREMITIES, BOWEL SOUNDS ACTIVE, PT ON RM AIR, PT HAS DAYLIN PICC RECEIVING NORMAL 
SALINE, PT DENIES PAIN, SAFETY PROTOCOLS IN PLACE WILL CONTINUE TO MONITOR PT

## 2020-07-20 NOTE — NUR
NOTE:



Patient's Orientation 

Unable To Assess

Information Provided By 

MARGARITA BOSTON - MOTHER

Comments 

SW WAS UNABLE TO MEET PATIENT AT BEDSIDE.

, Realtionship and Phone Number 

MARGARITA AUGUST

MOTHER

[phone]

Healthcare Power of  

No

Does Patient Have a POLST 

No

Identifying Problems 

No Social Work Triggers

Is A Social Work Consult Needed 

No

Mandate Report Filed 

No

Explanation Of Identifying Problems 

PATIENT IS A 30-YEAR-OLD MALE ADMITTED FOR DKA. PATIENT HAS NO PMHX.

Admitted From 

Home

Pre-Admission Level Of Functioning Status 

Independent/Ambulatory

Prior Resources/Services Used In Last 12 Months 

No Prior Resources Used

Prior DME 

No Prior DME Used

Living Situation 

Lives Alone

Other Living Situation/Comment 

PER MOTHER, PATIENT LIVES AT [address] [place] [address] AND LIVES 

ALONE.

Patient Had Caregiver 

No

Home Support 

No Caregiver Issues

Financial Issues 

No Known Financial Issue

Referral To The Financial Counselor Needed 

No

Factors/Needs 

No D/C Needs Identified

Pt/Rep Participated In Discharge Plan 

Yes

Patient/Family Agress With Discharge Plan 

Yes

Discharge Plan Comments 

TENTATIVE DISCHARGE PLAN IS FOR PATIENT TO RETURN HOME.

DC Plan Status 

Initiated

## 2020-07-21 VITALS — DIASTOLIC BLOOD PRESSURE: 93 MMHG | SYSTOLIC BLOOD PRESSURE: 36 MMHG

## 2020-07-21 VITALS — SYSTOLIC BLOOD PRESSURE: 143 MMHG | DIASTOLIC BLOOD PRESSURE: 98 MMHG

## 2020-07-21 VITALS — SYSTOLIC BLOOD PRESSURE: 13 MMHG | DIASTOLIC BLOOD PRESSURE: 88 MMHG

## 2020-07-21 VITALS — SYSTOLIC BLOOD PRESSURE: 116 MMHG | DIASTOLIC BLOOD PRESSURE: 69 MMHG

## 2020-07-21 VITALS — SYSTOLIC BLOOD PRESSURE: 141 MMHG | DIASTOLIC BLOOD PRESSURE: 86 MMHG

## 2020-07-21 VITALS — SYSTOLIC BLOOD PRESSURE: 164 MMHG | DIASTOLIC BLOOD PRESSURE: 98 MMHG

## 2020-07-21 VITALS — DIASTOLIC BLOOD PRESSURE: 98 MMHG | SYSTOLIC BLOOD PRESSURE: 140 MMHG

## 2020-07-21 VITALS — DIASTOLIC BLOOD PRESSURE: 79 MMHG | SYSTOLIC BLOOD PRESSURE: 133 MMHG

## 2020-07-21 VITALS — SYSTOLIC BLOOD PRESSURE: 105 MMHG | DIASTOLIC BLOOD PRESSURE: 71 MMHG

## 2020-07-21 VITALS — DIASTOLIC BLOOD PRESSURE: 83 MMHG | SYSTOLIC BLOOD PRESSURE: 131 MMHG

## 2020-07-21 VITALS — DIASTOLIC BLOOD PRESSURE: 98 MMHG | SYSTOLIC BLOOD PRESSURE: 142 MMHG

## 2020-07-21 VITALS — DIASTOLIC BLOOD PRESSURE: 92 MMHG | SYSTOLIC BLOOD PRESSURE: 143 MMHG

## 2020-07-21 LAB
ANION GAP SERPL CALCULATED.3IONS-SCNC: 12.6 MMOL/L (ref 8–16)
ANION GAP SERPL CALCULATED.3IONS-SCNC: 13.9 MMOL/L (ref 8–16)
BASOPHILS # BLD AUTO: 0 K/UL (ref 0–0.22)
BASOPHILS NFR BLD AUTO: 0.4 % (ref 0–2)
BUN SERPL-MCNC: 4 MG/DL (ref 7–18)
BUN SERPL-MCNC: 5 MG/DL (ref 7–18)
CHLORIDE SERPL-SCNC: 105 MMOL/L (ref 98–107)
CHLORIDE SERPL-SCNC: 106 MMOL/L (ref 98–107)
CO2 SERPL-SCNC: 24.5 MMOL/L (ref 21–32)
CO2 SERPL-SCNC: 25.3 MMOL/L (ref 21–32)
CREAT SERPL-MCNC: 0.7 MG/DL (ref 0.6–1.3)
CREAT SERPL-MCNC: 0.8 MG/DL (ref 0.6–1.3)
EOSINOPHIL # BLD AUTO: 0.2 K/UL (ref 0–0.4)
EOSINOPHIL NFR BLD AUTO: 4.1 % (ref 0–4)
ERYTHROCYTE [DISTWIDTH] IN BLOOD BY AUTOMATED COUNT: 14.9 % (ref 11.6–13.7)
GFR SERPL CREATININE-BSD FRML MDRD: 147 ML/MIN (ref 90–?)
GFR SERPL CREATININE-BSD FRML MDRD: 171 ML/MIN (ref 90–?)
GLUCOSE SERPL-MCNC: 237 MG/DL (ref 74–106)
GLUCOSE SERPL-MCNC: 265 MG/DL (ref 74–106)
HCT VFR BLD AUTO: 36.9 % (ref 36–52)
HGB BLD-MCNC: 12.4 G/DL (ref 12–18)
LYMPHOCYTES # BLD AUTO: 2.3 K/UL (ref 2–11.5)
LYMPHOCYTES NFR BLD AUTO: 55.6 % (ref 20.5–51.1)
MAGNESIUM SERPL-MCNC: 1.8 MG/DL (ref 1.8–2.4)
MCH RBC QN AUTO: 29 PG (ref 27–31)
MCHC RBC AUTO-ENTMCNC: 34 G/DL (ref 33–37)
MCV RBC AUTO: 86 FL (ref 80–94)
MONOCYTES # BLD AUTO: 0.3 K/UL (ref 0.8–1)
MONOCYTES NFR BLD AUTO: 6.2 % (ref 1.7–9.3)
NEUTROPHILS # BLD AUTO: 1.4 K/UL (ref 1.8–7.7)
NEUTROPHILS NFR BLD AUTO: 33.7 % (ref 42.2–75.2)
PHOSPHATE SERPL-MCNC: 2.9 MG/DL (ref 2.5–4.9)
PLATELET # BLD AUTO: 192 K/UL (ref 140–450)
POTASSIUM SERPL-SCNC: 3.4 MMOL/L (ref 3.5–5.1)
POTASSIUM SERPL-SCNC: 3.9 MMOL/L (ref 3.5–5.1)
RBC # BLD AUTO: 4.3 MIL/UL (ref 4.2–6.1)
SODIUM SERPL-SCNC: 140 MMOL/L (ref 136–145)
SODIUM SERPL-SCNC: 140 MMOL/L (ref 136–145)
WBC # BLD AUTO: 4.2 K/UL (ref 4.8–10.8)

## 2020-07-21 RX ADMIN — INSULIN GLARGINE SCH UNITS: 100 INJECTION, SOLUTION SUBCUTANEOUS at 21:00

## 2020-07-21 RX ADMIN — Medication SCH DEV: at 06:54

## 2020-07-21 RX ADMIN — Medication SCH DEV: at 16:30

## 2020-07-21 RX ADMIN — SODIUM CHLORIDE SCH MLS/HR: 9 INJECTION, SOLUTION INTRAVENOUS at 09:17

## 2020-07-21 RX ADMIN — SODIUM CHLORIDE SCH MLS/HR: 9 INJECTION, SOLUTION INTRAVENOUS at 04:00

## 2020-07-21 RX ADMIN — Medication SCH MG: at 21:53

## 2020-07-21 RX ADMIN — MAGNESIUM OXIDE TAB 400 MG (241.3 MG ELEMENTAL MG) SCH MG: 400 (241.3 MG) TAB at 09:12

## 2020-07-21 RX ADMIN — SODIUM CHLORIDE SCH MLS/HR: 9 INJECTION, SOLUTION INTRAVENOUS at 19:28

## 2020-07-21 RX ADMIN — INSULIN LISPRO PRN UNITS: 100 INJECTION, SOLUTION INTRAVENOUS; SUBCUTANEOUS at 17:45

## 2020-07-21 RX ADMIN — Medication SCH DEV: at 21:52

## 2020-07-21 RX ADMIN — INSULIN LISPRO PRN UNITS: 100 INJECTION, SOLUTION INTRAVENOUS; SUBCUTANEOUS at 11:59

## 2020-07-21 RX ADMIN — Medication SCH DEV: at 11:59

## 2020-07-21 RX ADMIN — INSULIN LISPRO PRN UNITS: 100 INJECTION, SOLUTION INTRAVENOUS; SUBCUTANEOUS at 06:54

## 2020-07-21 RX ADMIN — INSULIN LISPRO PRN UNITS: 100 INJECTION, SOLUTION INTRAVENOUS; SUBCUTANEOUS at 22:09

## 2020-07-21 RX ADMIN — Medication SCH MG: at 09:11

## 2020-07-21 NOTE — NUR
RECEIVED CONTINUITY OF CARE FROM AM NURSE. PATIENT IS ALERT AND ORIENTED TO PERSON, PLACE, 
TIME, AND DATE. SKIN IS WARM, DRY, INTACT. PATIENT IS ON ROOM AIR, SATURATING AT 99%. 
RESPIRATION IS EVEN AND UNLABORED. ACTIVE BOWEL SOUNDS THROUGH. SIGNS OF HYPOGLYCEMIA NOTED. 
DISCUSSED PLAN OF CARE WITH PATIENT. PATIENT VERBALIZED UNDERSTANDING. SAFETY PRECAUTIONS IN 
PLACE. CALL LIGHT WITHIN REACH. WILL CONTINUE TO MONITOR

## 2020-07-21 NOTE — NUR
REASSESSED PT KNOWLEDGE VERBALLY OF BLOOD SUGAR CHECK AND ADMINISTER INSULIN PT ABLE TO 
VERBALIZE UNDERSTANDING

## 2020-07-21 NOTE — NUR
BLOOD PRESSURE REASSESSED AND RECEIVED 135/70, PULSE IS 90. PATIENT IS IN STABLE CONDITION. 
PICC LINE REMOVED WITH CHARGE AT BEDSIDE. WRIST BAND REMOVED. BELONGINGS GIVEN TO PATIENT. 
PATIENT LEFT HOSPITAL BY PRIVATE VEHICLE

## 2020-07-21 NOTE — NUR
REPORT CALLED TO KENYATTA Jimenez ON TELEMETRY FLOOR.  PATIENT MOVED TO TELEMETRY FLOOR VIA 
WHEELCHAIR WITH BELONGINGS AND MEAL TRAY.

## 2020-07-21 NOTE — NUR
RECIEVED REPORT FROM ICU NURSE RADHA. PT RESTING IN BED. ABLE TO MAKE NEEDS KNOWN. 
RESPIRATIONS EVEN AND UNLABORED WITH NO SOB OR RESPIRATORY DISTRESS. SKIN WARM AND DRY TO 
TOUCH. SAFETY MEASURES IN PLACE. WILL CONTINUE TO MONITOR